# Patient Record
(demographics unavailable — no encounter records)

---

## 2025-02-13 NOTE — ASSESSMENT
[FreeTextEntry1] : In summary patient presents for obesity medicine follow-up.  She has done quite well on Mounjaro currently tolerating 5 mg dose.  Her weight loss has slowed down/stalled and would likely benefit from higher doses of Mounjaro.  New prescription placed for 7.5 mg to start in the next 2 to 3 weeks.  Will contact me in 1 to 2 months to discuss medication tolerance and further dose escalation.  I will see patient every 3 months for follow-up.  Denies negative adverse side effects currently on Mounjaro.  All questions and concerns answered at today's visit.

## 2025-02-13 NOTE — HISTORY OF PRESENT ILLNESS
[Home] : at home, [unfilled] , at the time of the visit. [Medical Office: ([facility])___] : at the medical office located in  [Verbal consent obtained from patient] : the patient, [unfilled] [FreeTextEntry1] : Otilia is a 55-year-old female who presents for initial obesity medicine consultation. Patient recently saw Dr. Brian Rao as she has 2 abdominal hernias, 1 umbilical hernia needing repair. Patient has a current BMI of 52 and was referred to myself to help patient lose weight preoperatively.  At today's visit we discussed several different obesity medicine options however due to patient's insurance and coverage barriers, she would not be covered for any antiobesity medications at this time. Patient has not had recent labs since 2020 and I will send for complete blood work to assess liver, kidney and pancreatic function along with vitamin levels and glucose and hemoglobin A1c.   was approved for Mounjaro and on 5mg dose.   Patient down 6 pounds since last being seen however reports being down about 10 pounds less.  She continues to take Mounjaro once weekly at 5 mg dose.  She reports having 3 weeks left of 5 mg dose.  Suggested patient inject every 5 days to speed up the time between escalating doses.  New prescription placed for 7.5 mg to local [facility] as per patient's request.  Will see patient in 3 months for follow-up.  I instructed patient to call me in approximately 6 weeks to discuss further escalation to either 10 or 12.5 mg dose at that time.  Patient verbalized understanding.

## 2025-02-13 NOTE — HISTORY OF PRESENT ILLNESS
[Home] : at home, [unfilled] , at the time of the visit. [Medical Office: ([facility])___] : at the medical office located in  [Verbal consent obtained from patient] : the patient, [unfilled] [FreeTextEntry1] : Otilia is a 55-year-old female who presents for initial obesity medicine consultation. Patient recently saw Dr. Brian Rao as she has 2 abdominal hernias, 1 umbilical hernia needing repair. Patient has a current BMI of 52 and was referred to myself to help patient lose weight preoperatively.  At today's visit we discussed several different obesity medicine options however due to patient's insurance and coverage barriers, she would not be covered for any antiobesity medications at this time. Patient has not had recent labs since 2020 and I will send for complete blood work to assess liver, kidney and pancreatic function along with vitamin levels and glucose and hemoglobin A1c.   was approved for Mounjaro and on 5mg dose.   Patient down 6 pounds since last being seen however reports being down about 10 pounds less.  She continues to take Mounjaro once weekly at 5 mg dose.  She reports having 3 weeks left of 5 mg dose.  Suggested patient inject every 5 days to speed up the time between escalating doses.  New prescription placed for 7.5 mg to local [facility] as per patient's request.  Will see patient in 3 months for follow-up.  I instructed patient to call me in approximately 6 weeks to discuss further escalation to either 10 or 12.5 mg dose at that time.  Patient verbalized understanding.

## 2025-05-28 NOTE — ASSESSMENT
[FreeTextEntry1] : In summary, patient presents for obesity medicine follow-up.  Is doing well on Mounjaro tolerating 10 mg with no reported adverse side effects.  Will increase to 15 mg based on tolerance.  Will see patient in 3 months for follow-up.  Recommended increasing physical activity as tolerated.  Work on caloric reduction with focus on lean high-quality proteins and a decrease in fats, sugars and carbohydrates.

## 2025-05-28 NOTE — HISTORY OF PRESENT ILLNESS
[Home] : at home, [unfilled] , at the time of the visit. [Medical Office: ([facility])___] : at the medical office located in  [Telehealth (audio & video)] : This visit was provided via telehealth using real-time 2-way audio visual technology. [Verbal consent obtained from patient] : the patient, [unfilled] [FreeTextEntry1] : Otilia is a 56-year-old female who presents for initial obesity medicine consultation. Patient recently saw Dr. Brian Rao as she has 2 abdominal hernias, 1 umbilical hernia needing repair. Patient has a current BMI of 52 and was referred to myself to help patient lose weight preoperatively.  At today's visit we discussed several different obesity medicine options however due to patient's insurance and coverage barriers, she would not be covered for any antiobesity medications at this time. Patient has not had recent labs since 2020 and I will send for complete blood work to assess liver, kidney and pancreatic function along with vitamin levels and glucose and hemoglobin A1c.  was approved for Mounjaro and on 10mg dose.  Patient down 6 pounds since last being seen.  Overall doing well and tolerating Mounjaro with no reported adverse side effects.  Will increase from 10 mg to 15 mg to expedite her weight loss.  Patient is in agreement with this plan.

## 2025-05-28 NOTE — HISTORY OF PRESENT ILLNESS
[Home] : at home, [unfilled] , at the time of the visit. [Medical Office: ([facility])___] : at the medical office located in  [Telehealth (audio & video)] : This visit was provided via telehealth using real-time 2-way audio visual technology. [Verbal consent obtained from patient] : the patient, [unfilled] [FreeTextEntry1] : Otilia is a 56-year-old female who presents for initial obesity medicine consultation. Patient recently saw Dr. Brian Rao as she has 2 abdominal hernias, 1 umbilical hernia needing repair. Patient has a current BMI of 52 and was referred to myself to help patient lose weight preoperatively.  At today's visit we discussed several different obesity medicine options however due to patient's insurance and coverage barriers, she would not be covered for any antiobesity medications at this time. Patient has not had recent labs since 2020 and I will send for complete blood work to assess liver, kidney and pancreatic function along with vitamin levels and glucose and hemoglobin A1c.  was approved for Mounjaro and on 10mg dose.  Patient down 6 pounds since last being seen.  Overall doing well and tolerating Mounjaro with no reported adverse side effects.  Will increase from 10 mg to 15 mg to expedite her weight loss.  Patient is in agreement with this plan.